# Patient Record
Sex: MALE | Race: WHITE | NOT HISPANIC OR LATINO | URBAN - METROPOLITAN AREA
[De-identification: names, ages, dates, MRNs, and addresses within clinical notes are randomized per-mention and may not be internally consistent; named-entity substitution may affect disease eponyms.]

---

## 2017-11-14 VITALS
HEIGHT: 68 IN | WEIGHT: 220.02 LBS | OXYGEN SATURATION: 96 % | HEART RATE: 80 BPM | DIASTOLIC BLOOD PRESSURE: 75 MMHG | RESPIRATION RATE: 16 BRPM | SYSTOLIC BLOOD PRESSURE: 130 MMHG | TEMPERATURE: 97 F

## 2017-11-14 RX ORDER — WARFARIN SODIUM 2.5 MG/1
0 TABLET ORAL
Qty: 0 | Refills: 0 | COMMUNITY

## 2017-11-14 NOTE — H&P ADULT - PMH
AAA (abdominal aortic aneurysm)    Atrial fibrillation    Coronary artery disease    Diabetes mellitus    Gout    Hyperlipidemia    Hypertension    Lung cancer AAA (abdominal aortic aneurysm)    Atrial fibrillation    Coronary artery disease    Diabetes mellitus    GIB (gastrointestinal bleeding)    Gout    Hyperlipidemia    Hypertension    Lung cancer

## 2017-11-14 NOTE — H&P ADULT - ASSESSMENT
84 y.o Male former heavy smoker with PMHx of HTN, Hyperlipidemia, NIDDM, Lung Cancer dx'd in 1970s s/p R partial lobectomy, AAA s/p EVAR in 04/2015, GIB in 2015 with unknown source s/p 4 units of PRBCs,  Afib on Coumadin (last dose on 11/10/17),  Known CAD with hx of 3VCABG @ [facility] on 05/26/2004 (LIMA-LAD, SVG-PDA, SVG-OM), who presents for recommended Cardiac Cath with possible intervention if clinically indicated secondary to worsening CCS Anginal Class 3 Equivalent Symptoms.        ASA: III and Mallampati: III  **OF NOTE: Pt with hx of Afib on Coumadin,  last dose taken on Friday 11/10/17.  INR of 1.2 today.  Pt also with hx of GIB ~ 2yrs ago workup revealed unknown source.  H/H remains stable today.  Pt was loaded with ASA 325mg PO X 1  and Plavix 600mg PO X 1 prior to procedure today.

## 2017-11-14 NOTE — H&P ADULT - HISTORY OF PRESENT ILLNESS
*** PT TO BRING IN ALL MEDS     84 y.o Male former heavy smoker with PMHx of HTN, Hyperlipidemia, NIDDM, Lung Cancer dx'd in 1970s s/p R partial lobectomy, AAA s/p EVAR in 04/2015, Afib (on Coumadin, last dose on 11/10/17),  Known CAD with hx of 3VCABG @ [facility] on 05/26/2004 (LIMA-LAD, SVG-PDA, SVG-OM), who presented to his cardiologist Dr. Harmon c/o progressively worsening FRANK with increasing fatigue upon minimal exertion over the past few months.  Pt states he can barely walk around his house and perform his ADLs before becoming easily winded. He denies any CP, recent PND/orthopnea, LE edema, palpitations, dizziness, syncope,  or decrease in exercise tolerance.    Echo done   revealed    In light of pt's risk factors, Known CAD, above CCS Anginal Class 3 Equivalent Symptoms with a preserved EF, pt is now referred to [facility] for recommended Cardiac Cath with possible intervention if clinically indicated to  r/o suspected progressive CAD. *** PT TO BRING IN ALL MEDS     84 y.o Male former heavy smoker with PMHx of HTN, Hyperlipidemia, NIDDM, Lung Cancer dx'd in 1970s s/p R partial lobectomy, AAA s/p EVAR in 04/2015, Afib (on Coumadin, last dose on 11/10/17),  Known CAD with hx of 3VCABG @ [facility] on 05/26/2004 (LIMA-LAD, SVG-PDA, SVG-OM), who presented to his cardiologist Dr. Harmon c/o progressively worsening FRANK with increasing fatigue upon minimal exertion over the past few months.  Pt states he can barely walk around his house and perform his ADLs before becoming easily winded often finding the need to sit down and  rest for awhile before completing a task. He denies any CP, recent PND/orthopnea, LE edema, palpitations, dizziness, syncope,  or decrease in exercise tolerance.  Echo done 05/15/17 revealed normal wall motion, mild concentric LVH, mildly LA and aortic root, LVEF of 55%.      In light of pt's risk factors, Known CAD, above CCS Anginal Class 3 Equivalent Symptoms with a preserved EF, pt is now referred to [facility] for recommended Cardiac Cath with possible intervention if clinically indicated to  r/o suspected progressive CAD. *** PT TO BRING IN ALL MEDS     84 y.o Male former heavy smoker with PMHx of HTN, Hyperlipidemia, NIDDM, Lung Cancer dx'd in 1970s s/p R partial lobectomy, AAA s/p EVAR in 04/2015, Afib on Coumadin (last dose on 11/10/17),  Known CAD with hx of 3VCABG @ [facility] on 05/26/2004 (LIMA-LAD, SVG-PDA, SVG-OM), who presented to his cardiologist Dr. Harmon c/o progressively worsening FRANK with increasing fatigue upon minimal exertion over the past few months.  Pt states he can barely walk around his house and perform his ADLs before becoming easily winded often finding the need to sit down and  rest for awhile before completing a task. He denies any CP, recent PND/orthopnea, LE edema, palpitations, dizziness, syncope,  or decrease in exercise tolerance.  Echo done 05/15/17 revealed normal wall motion, mild concentric LVH, mildly LA and aortic root, LVEF of 55%.      In light of pt's risk factors, Known CAD, above CCS Anginal Class 3 Equivalent Symptoms with a preserved EF, pt is now referred to [facility] for recommended Cardiac Cath with possible intervention if clinically indicated to  r/o suspected progressive CAD. 84 y.o Male former heavy smoker with PMHx of HTN, Hyperlipidemia, NIDDM, Lung Cancer dx'd in 1970s s/p R partial lobectomy, AAA s/p EVAR in 04/2015, GIB in 2015 with unknown source s/p 4 units of PRBCs,  Afib on Coumadin (last dose on 11/10/17),  Known CAD with hx of 3VCABG @ [facility] on 05/26/2004 (LIMA-LAD, SVG-PDA, SVG-OM), who presented to his cardiologist Dr. Harmon c/o progressively worsening FRANK with increasing fatigue upon minimal exertion over the past few months.  Pt states he can barely walk around his house and perform his ADLs before becoming easily winded often finding the need to sit down and  rest for awhile before completing a task. He denies any CP, recent PND/orthopnea, LE edema, palpitations, dizziness, syncope,  or decrease in exercise tolerance.  Echo done 05/15/17 revealed normal wall motion, mild concentric LVH, mildly LA and aortic root, LVEF of 55%.      In light of pt's risk factors, Known CAD, above CCS Anginal Class 3 Equivalent Symptoms with a preserved EF, pt is now referred to [facility] for recommended Cardiac Cath with possible intervention if clinically indicated to  r/o suspected progressive CAD.

## 2017-11-14 NOTE — H&P ADULT - PSH
Endoleak post (EVAR) endovascular aneurysm repair  In 2015  S/P CABG x 3  3VCABG @ [facility] on 05/26/2004 (LIMA-LAD, SVG-PDA, SVG-OM)  S/P partial lobectomy of lung

## 2017-11-15 ENCOUNTER — OUTPATIENT (OUTPATIENT)
Dept: OUTPATIENT SERVICES | Facility: HOSPITAL | Age: 82
LOS: 1 days | Discharge: NURSING, REHAB OR LONG-TERM CARE | End: 2017-11-15
Payer: MEDICARE

## 2017-11-15 DIAGNOSIS — Z95.1 PRESENCE OF AORTOCORONARY BYPASS GRAFT: Chronic | ICD-10-CM

## 2017-11-15 DIAGNOSIS — Z90.2 ACQUIRED ABSENCE OF LUNG [PART OF]: Chronic | ICD-10-CM

## 2017-11-15 DIAGNOSIS — T82.330A LEAKAGE OF AORTIC (BIFURCATION) GRAFT (REPLACEMENT), INITIAL ENCOUNTER: Chronic | ICD-10-CM

## 2017-11-15 LAB
ALBUMIN SERPL ELPH-MCNC: 3.9 G/DL — SIGNIFICANT CHANGE UP (ref 3.3–5)
ALP SERPL-CCNC: 71 U/L — SIGNIFICANT CHANGE UP (ref 40–120)
ALT FLD-CCNC: 8 U/L — LOW (ref 10–45)
ANION GAP SERPL CALC-SCNC: 14 MMOL/L — SIGNIFICANT CHANGE UP (ref 5–17)
APTT BLD: 28.8 SEC — SIGNIFICANT CHANGE UP (ref 27.5–37.4)
AST SERPL-CCNC: 11 U/L — SIGNIFICANT CHANGE UP (ref 10–40)
BASOPHILS NFR BLD AUTO: 0.1 % — SIGNIFICANT CHANGE UP (ref 0–2)
BILIRUB SERPL-MCNC: 0.4 MG/DL — SIGNIFICANT CHANGE UP (ref 0.2–1.2)
BUN SERPL-MCNC: 26 MG/DL — HIGH (ref 7–23)
CALCIUM SERPL-MCNC: 9.5 MG/DL — SIGNIFICANT CHANGE UP (ref 8.4–10.5)
CHLORIDE SERPL-SCNC: 94 MMOL/L — LOW (ref 96–108)
CHOLEST SERPL-MCNC: 162 MG/DL — SIGNIFICANT CHANGE UP (ref 10–199)
CK MB CFR SERPL CALC: 1.7 NG/ML — SIGNIFICANT CHANGE UP (ref 0–6.7)
CO2 SERPL-SCNC: 28 MMOL/L — SIGNIFICANT CHANGE UP (ref 22–31)
CREAT SERPL-MCNC: 1.13 MG/DL — SIGNIFICANT CHANGE UP (ref 0.5–1.3)
CRP SERPL-MCNC: 0.8 MG/DL — HIGH (ref 0–0.4)
EOSINOPHIL NFR BLD AUTO: 1.1 % — SIGNIFICANT CHANGE UP (ref 0–6)
GLUCOSE SERPL-MCNC: 127 MG/DL — HIGH (ref 70–99)
HBA1C BLD-MCNC: 6.6 % — HIGH (ref 4–5.6)
HCT VFR BLD CALC: 42.3 % — SIGNIFICANT CHANGE UP (ref 39–50)
HDLC SERPL-MCNC: 47 MG/DL — SIGNIFICANT CHANGE UP (ref 40–125)
HGB BLD-MCNC: 13.5 G/DL — SIGNIFICANT CHANGE UP (ref 13–17)
INR BLD: 1.28 — HIGH (ref 0.88–1.16)
LIPID PNL WITH DIRECT LDL SERPL: 93 MG/DL — SIGNIFICANT CHANGE UP
LYMPHOCYTES # BLD AUTO: 22.4 % — SIGNIFICANT CHANGE UP (ref 13–44)
MCHC RBC-ENTMCNC: 29.9 PG — SIGNIFICANT CHANGE UP (ref 27–34)
MCHC RBC-ENTMCNC: 31.9 G/DL — LOW (ref 32–36)
MCV RBC AUTO: 93.8 FL — SIGNIFICANT CHANGE UP (ref 80–100)
MONOCYTES NFR BLD AUTO: 8.6 % — SIGNIFICANT CHANGE UP (ref 2–14)
NEUTROPHILS NFR BLD AUTO: 67.8 % — SIGNIFICANT CHANGE UP (ref 43–77)
PLATELET # BLD AUTO: 189 K/UL — SIGNIFICANT CHANGE UP (ref 150–400)
POTASSIUM SERPL-MCNC: 3.8 MMOL/L — SIGNIFICANT CHANGE UP (ref 3.5–5.3)
POTASSIUM SERPL-SCNC: 3.8 MMOL/L — SIGNIFICANT CHANGE UP (ref 3.5–5.3)
PROT SERPL-MCNC: 8.1 G/DL — SIGNIFICANT CHANGE UP (ref 6–8.3)
PROTHROM AB SERPL-ACNC: 14.3 SEC — HIGH (ref 9.8–12.7)
RBC # BLD: 4.51 M/UL — SIGNIFICANT CHANGE UP (ref 4.2–5.8)
RBC # FLD: 14.5 % — SIGNIFICANT CHANGE UP (ref 10.3–16.9)
SODIUM SERPL-SCNC: 136 MMOL/L — SIGNIFICANT CHANGE UP (ref 135–145)
TOTAL CHOLESTEROL/HDL RATIO MEASUREMENT: 3.4 RATIO — SIGNIFICANT CHANGE UP (ref 3.4–9.6)
TRIGL SERPL-MCNC: 112 MG/DL — SIGNIFICANT CHANGE UP (ref 10–149)
WBC # BLD: 10.1 K/UL — SIGNIFICANT CHANGE UP (ref 3.8–10.5)
WBC # FLD AUTO: 10.1 K/UL — SIGNIFICANT CHANGE UP (ref 3.8–10.5)

## 2017-11-15 PROCEDURE — 85610 PROTHROMBIN TIME: CPT

## 2017-11-15 PROCEDURE — 36415 COLL VENOUS BLD VENIPUNCTURE: CPT

## 2017-11-15 PROCEDURE — 93459 L HRT ART/GRFT ANGIO: CPT | Mod: 26

## 2017-11-15 PROCEDURE — 82550 ASSAY OF CK (CPK): CPT

## 2017-11-15 PROCEDURE — C1887: CPT

## 2017-11-15 PROCEDURE — 93459 L HRT ART/GRFT ANGIO: CPT

## 2017-11-15 PROCEDURE — 80061 LIPID PANEL: CPT

## 2017-11-15 PROCEDURE — C1769: CPT

## 2017-11-15 PROCEDURE — 82553 CREATINE MB FRACTION: CPT

## 2017-11-15 PROCEDURE — 85025 COMPLETE CBC W/AUTO DIFF WBC: CPT

## 2017-11-15 PROCEDURE — 93567 NJX CAR CTH SPRVLV AORTGRPHY: CPT

## 2017-11-15 PROCEDURE — C1760: CPT

## 2017-11-15 PROCEDURE — 83036 HEMOGLOBIN GLYCOSYLATED A1C: CPT

## 2017-11-15 PROCEDURE — C1889: CPT

## 2017-11-15 PROCEDURE — 80053 COMPREHEN METABOLIC PANEL: CPT

## 2017-11-15 PROCEDURE — 86140 C-REACTIVE PROTEIN: CPT

## 2017-11-15 PROCEDURE — C1894: CPT

## 2017-11-15 PROCEDURE — 85730 THROMBOPLASTIN TIME PARTIAL: CPT

## 2017-11-15 RX ORDER — ALLOPURINOL 300 MG
2 TABLET ORAL
Qty: 0 | Refills: 0 | COMMUNITY

## 2017-11-15 RX ORDER — WARFARIN SODIUM 2.5 MG/1
0 TABLET ORAL
Qty: 0 | Refills: 0 | COMMUNITY

## 2017-11-15 RX ORDER — METFORMIN HYDROCHLORIDE 850 MG/1
1 TABLET ORAL
Qty: 0 | Refills: 0 | COMMUNITY

## 2017-11-15 RX ORDER — WARFARIN SODIUM 2.5 MG/1
1 TABLET ORAL
Qty: 0 | Refills: 0 | COMMUNITY

## 2017-11-15 RX ORDER — ALLOPURINOL 300 MG
1 TABLET ORAL
Qty: 0 | Refills: 0 | COMMUNITY

## 2017-11-15 RX ORDER — CHLORHEXIDINE GLUCONATE 213 G/1000ML
1 SOLUTION TOPICAL ONCE
Qty: 0 | Refills: 0 | Status: DISCONTINUED | OUTPATIENT
Start: 2017-11-15 | End: 2017-11-15

## 2017-11-15 RX ORDER — METOPROLOL TARTRATE 50 MG
25 TABLET ORAL
Qty: 0 | Refills: 0 | COMMUNITY

## 2017-11-15 RX ORDER — ATORVASTATIN CALCIUM 80 MG/1
1 TABLET, FILM COATED ORAL
Qty: 0 | Refills: 0 | COMMUNITY

## 2017-11-15 RX ORDER — ASPIRIN/CALCIUM CARB/MAGNESIUM 324 MG
1 TABLET ORAL
Qty: 0 | Refills: 0 | COMMUNITY

## 2017-11-15 RX ORDER — CLOPIDOGREL BISULFATE 75 MG/1
600 TABLET, FILM COATED ORAL ONCE
Qty: 0 | Refills: 0 | Status: DISCONTINUED | OUTPATIENT
Start: 2017-11-15 | End: 2017-11-15

## 2017-11-15 RX ORDER — ASPIRIN/CALCIUM CARB/MAGNESIUM 324 MG
325 TABLET ORAL ONCE
Qty: 0 | Refills: 0 | Status: DISCONTINUED | OUTPATIENT
Start: 2017-11-15 | End: 2017-11-15

## 2017-11-15 RX ORDER — BENAZEPRIL HYDROCHLORIDE 40 MG/1
1 TABLET ORAL
Qty: 0 | Refills: 0 | COMMUNITY

## 2017-11-15 RX ORDER — SODIUM CHLORIDE 9 MG/ML
500 INJECTION INTRAMUSCULAR; INTRAVENOUS; SUBCUTANEOUS
Qty: 0 | Refills: 0 | Status: DISCONTINUED | OUTPATIENT
Start: 2017-11-15 | End: 2017-11-15

## 2017-11-15 RX ORDER — INSULIN LISPRO 100/ML
VIAL (ML) SUBCUTANEOUS ONCE
Qty: 0 | Refills: 0 | Status: DISCONTINUED | OUTPATIENT
Start: 2017-11-15 | End: 2017-11-15

## 2017-11-15 NOTE — PROGRESS NOTE ADULT - SUBJECTIVE AND OBJECTIVE BOX
Procedure: LHC, [place]  Indication: UA, CAD  Complication: none    Result:  1) Three vessel CAD ( 80% LM, 100% pLAD, 80% OM1, 70% pRCA)  2) Normal LV function, EF65%, LVEDP 9  3) Patent LIMA to LAD  4) Patent SVG to RCA  5) Occluded SVG to OM1  6) Mild AI, no AS    Plan: Medical management. D/C home. To f/u with Dr. Harmon.

## 2017-11-15 NOTE — PROGRESS NOTE ADULT - SUBJECTIVE AND OBJECTIVE BOX
Interventional Cardiology PA SDA Discharge Note    Patient seen and examined comfortably, denies current complaints    Afebrile, VSS    Ext:    		Right Groin:       hematoma,     bruit, dressing; C/D/I        Pulses:    intact right DP/PT at baseline     A/P:  84 y.o Male former heavy smoker with PMHx of HTN, Hyperlipidemia, NIDDM, Lung Cancer dx'd in 1970s s/p R partial lobectomy, AAA s/p EVAR in 04/2015, CAD s/p prior CABG with CCS Angina Class III equivalent symptoms who presented to [facility] for evaluation. Patient s/p diagnostic cath revealing 3VD:  80% LM, 100% pLAD, 80% OM1, 70% pRCA, EF65%, LVEDP 9mmHg.  Patent LIMA to LAD, patent SVG to RCA,  and occluded SVG to OM1. Patient recommended to continue current medical therapy and follow-up with Dr. Harmon in 1-2 weeks.                  1.	Stable for discharge as per attending Dr. Porter after bed rest, pt voids, groin  stable and 30 minutes of ambulation.  2.	Follow-up with PMD/Cardiologist Dr. Harmon in 1-2 weeks  3.	Discharged forms signed and copies in chart

## 2018-06-11 PROBLEM — E78.5 HYPERLIPIDEMIA, UNSPECIFIED: Chronic | Status: ACTIVE | Noted: 2017-11-14

## 2018-06-11 PROBLEM — E11.9 TYPE 2 DIABETES MELLITUS WITHOUT COMPLICATIONS: Chronic | Status: ACTIVE | Noted: 2017-11-14

## 2018-06-11 PROBLEM — I48.91 UNSPECIFIED ATRIAL FIBRILLATION: Chronic | Status: ACTIVE | Noted: 2017-11-14

## 2018-06-11 PROBLEM — M10.9 GOUT, UNSPECIFIED: Chronic | Status: ACTIVE | Noted: 2017-11-14

## 2018-06-11 PROBLEM — I10 ESSENTIAL (PRIMARY) HYPERTENSION: Chronic | Status: ACTIVE | Noted: 2017-11-14

## 2018-06-11 PROBLEM — I25.10 ATHEROSCLEROTIC HEART DISEASE OF NATIVE CORONARY ARTERY WITHOUT ANGINA PECTORIS: Chronic | Status: ACTIVE | Noted: 2017-11-14

## 2018-06-11 PROBLEM — C34.90 MALIGNANT NEOPLASM OF UNSPECIFIED PART OF UNSPECIFIED BRONCHUS OR LUNG: Chronic | Status: ACTIVE | Noted: 2017-11-14

## 2018-06-11 PROBLEM — I71.4 ABDOMINAL AORTIC ANEURYSM, WITHOUT RUPTURE: Chronic | Status: ACTIVE | Noted: 2017-11-14

## 2018-06-11 PROBLEM — K92.2 GASTROINTESTINAL HEMORRHAGE, UNSPECIFIED: Chronic | Status: ACTIVE | Noted: 2017-11-15

## 2018-07-23 PROBLEM — Z00.00 ENCOUNTER FOR PREVENTIVE HEALTH EXAMINATION: Status: ACTIVE | Noted: 2018-07-23

## 2018-08-22 ENCOUNTER — APPOINTMENT (OUTPATIENT)
Dept: HEART AND VASCULAR | Facility: CLINIC | Age: 83
End: 2018-08-22
Payer: MEDICARE

## 2018-08-22 VITALS — SYSTOLIC BLOOD PRESSURE: 125 MMHG | DIASTOLIC BLOOD PRESSURE: 70 MMHG

## 2018-08-22 VITALS — BODY MASS INDEX: 31.1 KG/M2 | HEIGHT: 69 IN | WEIGHT: 210 LBS

## 2018-08-22 DIAGNOSIS — I70.8 ATHEROSCLEROSIS OF OTHER ARTERIES: ICD-10-CM

## 2018-08-22 DIAGNOSIS — Z87.891 PERSONAL HISTORY OF NICOTINE DEPENDENCE: ICD-10-CM

## 2018-08-22 DIAGNOSIS — M10.9 GOUT, UNSPECIFIED: ICD-10-CM

## 2018-08-22 DIAGNOSIS — Z85.118 PERSONAL HISTORY OF OTHER MALIGNANT NEOPLASM OF BRONCHUS AND LUNG: ICD-10-CM

## 2018-08-22 LAB — INR PPP: 1.6 RATIO

## 2018-08-22 PROCEDURE — 93306 TTE W/DOPPLER COMPLETE: CPT

## 2018-08-22 PROCEDURE — 93000 ELECTROCARDIOGRAM COMPLETE: CPT

## 2018-08-22 PROCEDURE — 99214 OFFICE O/P EST MOD 30 MIN: CPT | Mod: 25

## 2018-08-22 RX ORDER — TORSEMIDE 10 MG/1
10 TABLET ORAL
Qty: 30 | Refills: 0 | Status: DISCONTINUED | COMMUNITY
Start: 2018-06-12 | End: 2018-08-22

## 2018-08-22 RX ORDER — OXYCODONE AND ACETAMINOPHEN 5; 325 MG/1; MG/1
5-325 TABLET ORAL
Qty: 14 | Refills: 0 | Status: ACTIVE | COMMUNITY
Start: 2018-07-18

## 2018-08-22 RX ORDER — ALLOPURINOL 100 MG/1
100 TABLET ORAL
Refills: 0 | Status: ACTIVE | COMMUNITY
Start: 2017-10-17

## 2018-08-22 RX ORDER — MAGNESIUM HYDROXIDE 1200 MG/15ML
0.9 LIQUID ORAL
Qty: 1000 | Refills: 0 | Status: ACTIVE | COMMUNITY
Start: 2018-06-13

## 2018-08-22 RX ORDER — WARFARIN 4 MG/1
4 TABLET ORAL
Qty: 30 | Refills: 0 | Status: DISCONTINUED | COMMUNITY
Start: 2018-06-11 | End: 2018-08-22

## 2018-11-14 ENCOUNTER — APPOINTMENT (OUTPATIENT)
Dept: HEART AND VASCULAR | Facility: CLINIC | Age: 83
End: 2018-11-14
Payer: MEDICARE

## 2018-11-14 VITALS — SYSTOLIC BLOOD PRESSURE: 140 MMHG | DIASTOLIC BLOOD PRESSURE: 70 MMHG

## 2018-11-14 VITALS — BODY MASS INDEX: 31.1 KG/M2 | HEIGHT: 69 IN | WEIGHT: 210 LBS

## 2018-11-14 PROCEDURE — 99214 OFFICE O/P EST MOD 30 MIN: CPT

## 2018-11-14 PROCEDURE — 93000 ELECTROCARDIOGRAM COMPLETE: CPT

## 2018-11-14 RX ORDER — BENAZEPRIL HYDROCHLORIDE 40 MG/1
40 TABLET, FILM COATED ORAL
Refills: 0 | Status: DISCONTINUED | COMMUNITY
Start: 2017-10-17 | End: 2018-11-14

## 2018-11-14 NOTE — REVIEW OF SYSTEMS
[Feeling Fatigued] : feeling fatigued [Shortness Of Breath] : shortness of breath [Palpitations] : palpitations [Abdominal Pain] : no abdominal pain

## 2018-11-14 NOTE — ASSESSMENT
[FreeTextEntry1] : Patient has a persistent symptoms of dyspnea despite pretty well preserved LV function and minimal amount of ischemic disease. In light of the fact that he had a prior lung resection and has audible wheezing on exam it would be reasonable for him to get a pulmonary evaluation for her PFTs and/or CT scan and determine whether or not he would benefit from bronchodilator therapy\par Target LDL<70 HDL >45 \par Target INR 2-3 for embolic prophylaxis

## 2018-11-14 NOTE — PHYSICAL EXAM
[General Appearance - Well Developed] : well developed [Normal Appearance] : normal appearance [Well Groomed] : well groomed [General Appearance - Well Nourished] : well nourished [No Deformities] : no deformities [General Appearance - In No Acute Distress] : no acute distress [Normal Conjunctiva] : the conjunctiva exhibited no abnormalities [Eyelids - No Xanthelasma] : the eyelids demonstrated no xanthelasmas [Normal Oral Mucosa] : normal oral mucosa [No Oral Pallor] : no oral pallor [No Oral Cyanosis] : no oral cyanosis [Normal Jugular Venous A Waves Present] : normal jugular venous A waves present [Normal Jugular Venous V Waves Present] : normal jugular venous V waves present [No Jugular Venous Sandoval A Waves] : no jugular venous sandoval A waves [Abdomen Soft] : soft [Abdomen Tenderness] : non-tender [Abdomen Mass (___ Cm)] : no abdominal mass palpated [Abnormal Walk] : normal gait [Gait - Sufficient For Exercise Testing] : the gait was sufficient for exercise testing [Nail Clubbing] : no clubbing of the fingernails [Cyanosis, Localized] : no localized cyanosis [Petechial Hemorrhages (___cm)] : no petechial hemorrhages [Skin Color & Pigmentation] : normal skin color and pigmentation [] : no rash [No Venous Stasis] : no venous stasis [Skin Lesions] : no skin lesions [No Skin Ulcers] : no skin ulcer [No Xanthoma] : no  xanthoma was observed [Scattered Wheezes] : scattered wheezing was heard [Normal] : normal [Irregularly Irregular] : irregularly irregular [Normal S1] : normal S1 [Normal S2] : normal S2 [II] : a grade 2 [2+] : left 2+ [1+] : left 1+ [0] : left 0 [No Pitting Edema] : no pitting edema present [Rt] : varicose veins of the right leg noted [Lt] : varicose veins of the left leg noted [Wheezing Unilaterally On The Right At The Midlung Field] : wheezing was heard over the right midlung field [Wheezing Unilaterally On The Right At The Base] : wheezing was heard over the right base

## 2018-11-14 NOTE — HISTORY OF PRESENT ILLNESS
[FreeTextEntry1] : The patient is an 85-year-old white male with a prior history of coronary bypass surgery. He underwent evaluation last year during which a catheterization revealed a patent LIMA vessel and patent vein grafts to the distal RCA with an isolated vein graft occlusion to the OM vessel EF at that time was 55%. He is also maintained on meds for chronic atrial fibrillation. He still has moderate degree of dyspnea typically with slightly greater than normal levels of activity including carrying packages and working up her pills. He has no signs of edema orthopnea PND. His sleep pattern is by his description largely due to the severe insomnia.

## 2019-03-28 ENCOUNTER — APPOINTMENT (OUTPATIENT)
Dept: HEART AND VASCULAR | Facility: CLINIC | Age: 84
End: 2019-03-28
Payer: MEDICARE

## 2019-03-28 VITALS — HEART RATE: 76 BPM | OXYGEN SATURATION: 97 % | SYSTOLIC BLOOD PRESSURE: 140 MMHG | DIASTOLIC BLOOD PRESSURE: 70 MMHG

## 2019-03-28 VITALS — WEIGHT: 220 LBS | HEIGHT: 69 IN | BODY MASS INDEX: 32.58 KG/M2

## 2019-03-28 LAB — INR PPP: 2.4

## 2019-03-28 PROCEDURE — 99214 OFFICE O/P EST MOD 30 MIN: CPT

## 2019-03-28 PROCEDURE — 93000 ELECTROCARDIOGRAM COMPLETE: CPT

## 2019-03-28 NOTE — HISTORY OF PRESENT ILLNESS
[FreeTextEntry1] : The patient is an 85-year-old white male with a prior history of coronary bypass surgery. He underwent evaluation last year during which a catheterization revealed a patent LIMA vessel and patent vein grafts to the distal RCA with an isolated vein graft occlusion to the OM vessel EF at that time was 55%. He is also maintained on meds for chronic atrial fibrillation. He still has moderate degree of dyspnea typically with slightly greater than normal levels of activity including carrying packages and working up her pills. He has no signs of edema orthopnea PND. His sleep pattern is by his description largely due to the severe insomnia.\par seen by PULM for dyspnea has scar tissue on CT scan \par awaiting PFTs \par no wheeze no edema or orthopnea

## 2019-03-28 NOTE — ASSESSMENT
[FreeTextEntry1] : Patient has a persistent symptoms of dyspnea despite pretty well preserved LV function and minimal amount of ischemic disease. In light of the fact that he had a prior lung resection and has audible wheezing on exam it would be reasonable for him to get a pulmonary evaluation for her PFTs and/or CT scan and determine whether or not he would benefit from bronchodilator therapy\par Target LDL<70 HDL >45 \par Target INR 2-3 for embolic prophylaxis \par INR today 2.4  on coumadin 5mg x2 2.5 x 5days \par slow afib on ekg with periodic dizziness \par will lower metoprolol to 25 qd \par f/u 2 mos ? holter need to assess SSS

## 2019-03-28 NOTE — PHYSICAL EXAM
[General Appearance - Well Developed] : well developed [Normal Appearance] : normal appearance [Well Groomed] : well groomed [General Appearance - Well Nourished] : well nourished [No Deformities] : no deformities [General Appearance - In No Acute Distress] : no acute distress [Normal Conjunctiva] : the conjunctiva exhibited no abnormalities [Eyelids - No Xanthelasma] : the eyelids demonstrated no xanthelasmas [Normal Oral Mucosa] : normal oral mucosa [No Oral Pallor] : no oral pallor [No Oral Cyanosis] : no oral cyanosis [Normal Jugular Venous A Waves Present] : normal jugular venous A waves present [Normal Jugular Venous V Waves Present] : normal jugular venous V waves present [No Jugular Venous Sandoval A Waves] : no jugular venous sandoval A waves [Abdomen Soft] : soft [Abdomen Tenderness] : non-tender [Abdomen Mass (___ Cm)] : no abdominal mass palpated [Abnormal Walk] : normal gait [Gait - Sufficient For Exercise Testing] : the gait was sufficient for exercise testing [Nail Clubbing] : no clubbing of the fingernails [Cyanosis, Localized] : no localized cyanosis [Petechial Hemorrhages (___cm)] : no petechial hemorrhages [Skin Color & Pigmentation] : normal skin color and pigmentation [] : no rash [No Venous Stasis] : no venous stasis [Skin Lesions] : no skin lesions [No Skin Ulcers] : no skin ulcer [No Xanthoma] : no  xanthoma was observed [Scattered Wheezes] : scattered wheezing was heard [Wheezing Unilaterally On The Right At The Midlung Field] : wheezing was heard over the right midlung field [Wheezing Unilaterally On The Right At The Base] : wheezing was heard over the right base [Normal] : normal [Irregularly Irregular] : irregularly irregular [Normal S1] : normal S1 [Normal S2] : normal S2 [II] : a grade 2 [2+] : left 2+ [1+] : left 1+ [0] : left 0 [No Pitting Edema] : no pitting edema present [Rt] : varicose veins of the right leg noted [Lt] : varicose veins of the left leg noted

## 2019-06-27 ENCOUNTER — APPOINTMENT (OUTPATIENT)
Dept: HEART AND VASCULAR | Facility: CLINIC | Age: 84
End: 2019-06-27
Payer: MEDICARE

## 2019-06-27 VITALS — HEIGHT: 69 IN | BODY MASS INDEX: 33.33 KG/M2 | WEIGHT: 225 LBS

## 2019-06-27 LAB — INR PPP: 1.8 RATIO

## 2019-06-27 PROCEDURE — 93000 ELECTROCARDIOGRAM COMPLETE: CPT

## 2019-06-27 PROCEDURE — 99214 OFFICE O/P EST MOD 30 MIN: CPT

## 2019-06-27 NOTE — ASSESSMENT
[FreeTextEntry1] : dyspnea more related to COPD and pulm fibrosis \par no dizziness or syncopy on lower dose of beta blcoker\par INR 1.8 will leave on coumadin 5mg x 2days and 2.5 mg x5 days

## 2019-06-27 NOTE — PHYSICAL EXAM
[General Appearance - Well Developed] : well developed [Normal Appearance] : normal appearance [Well Groomed] : well groomed [General Appearance - Well Nourished] : well nourished [No Deformities] : no deformities [General Appearance - In No Acute Distress] : no acute distress [Normal Conjunctiva] : the conjunctiva exhibited no abnormalities [Eyelids - No Xanthelasma] : the eyelids demonstrated no xanthelasmas [Normal Oral Mucosa] : normal oral mucosa [No Oral Pallor] : no oral pallor [No Oral Cyanosis] : no oral cyanosis [Normal Jugular Venous A Waves Present] : normal jugular venous A waves present [Normal Jugular Venous V Waves Present] : normal jugular venous V waves present [No Jugular Venous Sandoval A Waves] : no jugular venous sandoval A waves [Abdomen Soft] : soft [Abdomen Tenderness] : non-tender [Abnormal Walk] : normal gait [Gait - Sufficient For Exercise Testing] : the gait was sufficient for exercise testing [Abdomen Mass (___ Cm)] : no abdominal mass palpated [Cyanosis, Localized] : no localized cyanosis [Nail Clubbing] : no clubbing of the fingernails [Petechial Hemorrhages (___cm)] : no petechial hemorrhages [Skin Color & Pigmentation] : normal skin color and pigmentation [] : no rash [No Venous Stasis] : no venous stasis [No Skin Ulcers] : no skin ulcer [Skin Lesions] : no skin lesions [No Xanthoma] : no  xanthoma was observed [Scattered Wheezes] : scattered wheezing was heard [Wheezing Unilaterally On The Right At The Midlung Field] : wheezing was heard over the right midlung field [Wheezing Unilaterally On The Right At The Base] : wheezing was heard over the right base [Normal] : normal [Normal S1] : normal S1 [Irregularly Irregular] : irregularly irregular [Normal S2] : normal S2 [II] : a grade 2 [2+] : left 2+ [1+] : left 1+ [No Pitting Edema] : no pitting edema present [0] : left 0 [Rt] : varicose veins of the right leg noted [Lt] : varicose veins of the left leg noted

## 2019-06-27 NOTE — HISTORY OF PRESENT ILLNESS
[FreeTextEntry1] : The patient is an 85-year-old white male with a prior history of coronary bypass surgery. He underwent evaluation last year during which a catheterization revealed a patent LIMA vessel and patent vein grafts to the distal RCA with an isolated vein graft occlusion to the OM vessel EF at that time was 55%. He is also maintained on meds for chronic atrial fibrillation. He still has moderate degree of dyspnea typically with slightly greater than normal levels of activity including carrying packages and working up her pills. He has no signs of edema orthopnea PND. His sleep pattern is by his description largely due to the severe insomnia.\par seen by PULM for dyspnea has scar tissue on CT scan \par PFT c/w with reduced vent function\par no benefit w inhalers \par no wheeze no edema or orthopnea

## 2019-10-03 ENCOUNTER — APPOINTMENT (OUTPATIENT)
Dept: HEART AND VASCULAR | Facility: CLINIC | Age: 84
End: 2019-10-03
Payer: MEDICARE

## 2019-10-03 ENCOUNTER — NON-APPOINTMENT (OUTPATIENT)
Age: 84
End: 2019-10-03

## 2019-10-03 VITALS
WEIGHT: 219 LBS | DIASTOLIC BLOOD PRESSURE: 70 MMHG | SYSTOLIC BLOOD PRESSURE: 125 MMHG | BODY MASS INDEX: 32.44 KG/M2 | HEIGHT: 69 IN

## 2019-10-03 VITALS — OXYGEN SATURATION: 99 %

## 2019-10-03 DIAGNOSIS — R27.0 ATAXIA, UNSPECIFIED: ICD-10-CM

## 2019-10-03 LAB — INR PPP: 2 RATIO

## 2019-10-03 PROCEDURE — 93000 ELECTROCARDIOGRAM COMPLETE: CPT

## 2019-10-03 PROCEDURE — 93880 EXTRACRANIAL BILAT STUDY: CPT

## 2019-10-03 PROCEDURE — 93306 TTE W/DOPPLER COMPLETE: CPT

## 2019-10-03 PROCEDURE — 99214 OFFICE O/P EST MOD 30 MIN: CPT

## 2019-10-03 NOTE — HISTORY OF PRESENT ILLNESS
[FreeTextEntry1] : The patient is an 85-year-old white male with a prior history of coronary bypass surgery. He underwent evaluation last year during which a catheterization revealed a patent LIMA vessel and patent vein grafts to the distal RCA with an isolated vein graft occlusion to the OM vessel EF at that time was 55%. He is also maintained on meds for chronic atrial fibrillation. He still has moderate degree of dyspnea typically with slightly greater than normal levels of activity including carrying packages and working up her pills. He has no signs of edema orthopnea PND. His sleep pattern is by his description largely due to the severe insomnia.\par seen by PULM for dyspnea has scar tissue on CT scan \par PFT c/w with reduced vent function\par no benefit w inhalers \par \par Worseing gait with imbalance and pain in legs \par no wheeze no edema or orthopnea \par tolerates 1/2 block dyspnea \par pulm htn on prior echo

## 2019-10-03 NOTE — ASSESSMENT
[FreeTextEntry1] : Imprerssion\par Afib controlled on meds INR today 2.0 will continue \par 5 days 2.5 mg and 2 days 5mg\par meds renewed\par No progression of carotid disease \par walking limited due to lung fibrosis O2 sat 99 at rest

## 2019-10-03 NOTE — PHYSICAL EXAM
[General Appearance - Well Developed] : well developed [Normal Appearance] : normal appearance [Well Groomed] : well groomed [General Appearance - Well Nourished] : well nourished [No Deformities] : no deformities [General Appearance - In No Acute Distress] : no acute distress [Normal Conjunctiva] : the conjunctiva exhibited no abnormalities [Eyelids - No Xanthelasma] : the eyelids demonstrated no xanthelasmas [Normal Oral Mucosa] : normal oral mucosa [No Oral Pallor] : no oral pallor [No Oral Cyanosis] : no oral cyanosis [Normal Jugular Venous A Waves Present] : normal jugular venous A waves present [Normal Jugular Venous V Waves Present] : normal jugular venous V waves present [No Jugular Venous Sandoval A Waves] : no jugular venous sandoval A waves [Abdomen Soft] : soft [Abdomen Tenderness] : non-tender [Abdomen Mass (___ Cm)] : no abdominal mass palpated [Abnormal Walk] : normal gait [Gait - Sufficient For Exercise Testing] : the gait was sufficient for exercise testing [Nail Clubbing] : no clubbing of the fingernails [Cyanosis, Localized] : no localized cyanosis [Petechial Hemorrhages (___cm)] : no petechial hemorrhages [Skin Color & Pigmentation] : normal skin color and pigmentation [] : no rash [No Venous Stasis] : no venous stasis [Skin Lesions] : no skin lesions [No Skin Ulcers] : no skin ulcer [No Xanthoma] : no  xanthoma was observed [Scattered Wheezes] : scattered wheezing was heard [Wheezing Unilaterally On The Right At The Midlung Field] : wheezing was heard over the right midlung field [Wheezing Unilaterally On The Right At The Base] : wheezing was heard over the right base [Normal] : normal [Irregularly Irregular] : irregularly irregular [Normal S1] : normal S1 [Normal S2] : normal S2 [II] : a grade 2 [2+] : left 2+ [1+] : left 1+ [0] : left 0 [No Pitting Edema] : no pitting edema present [Rt] : varicose veins of the right leg noted [Lt] : varicose veins of the left leg noted [FreeTextEntry1] : scattered wheeze

## 2019-10-03 NOTE — REASON FOR VISIT
[FreeTextEntry1] : AFIB \par CAD\par PVD\par progressive dyspnea with exertion\par GAIT ABNL \par pulmonary fibrosis

## 2020-01-02 ENCOUNTER — RX RENEWAL (OUTPATIENT)
Age: 85
End: 2020-01-02

## 2020-02-06 ENCOUNTER — NON-APPOINTMENT (OUTPATIENT)
Age: 85
End: 2020-02-06

## 2020-02-06 ENCOUNTER — APPOINTMENT (OUTPATIENT)
Dept: HEART AND VASCULAR | Facility: CLINIC | Age: 85
End: 2020-02-06
Payer: MEDICARE

## 2020-02-06 VITALS
SYSTOLIC BLOOD PRESSURE: 160 MMHG | DIASTOLIC BLOOD PRESSURE: 80 MMHG | HEART RATE: 64 BPM | BODY MASS INDEX: 33.47 KG/M2 | WEIGHT: 226 LBS | OXYGEN SATURATION: 96 % | HEIGHT: 69 IN

## 2020-02-06 LAB — INR PPP: 1.9 RATIO

## 2020-02-06 PROCEDURE — 99215 OFFICE O/P EST HI 40 MIN: CPT

## 2020-02-06 PROCEDURE — 36415 COLL VENOUS BLD VENIPUNCTURE: CPT

## 2020-02-06 PROCEDURE — 93000 ELECTROCARDIOGRAM COMPLETE: CPT

## 2020-02-06 PROCEDURE — 85610 PROTHROMBIN TIME: CPT | Mod: QW

## 2020-02-06 RX ORDER — TORSEMIDE 20 MG/1
20 TABLET ORAL
Qty: 90 | Refills: 3 | Status: DISCONTINUED | COMMUNITY
Start: 2018-02-26 | End: 2020-02-06

## 2020-02-06 NOTE — ASSESSMENT
[FreeTextEntry1] : Imprerssion\par Patient is a bit more dyspnea with lower levels of activity there is no overt signs of fluid overload on exam he has chronic crackles on his lung exam with decreased breath sounds at the right base chest x-ray was ordered his blood pressure Benazapril increased from 20 mg per day to 20 mg twice a day torsemide will increase twice a day for 3 days to augment some degree of diuresis.t \par Inr 1.9 coumadin dose reviewe d

## 2020-02-06 NOTE — REVIEW OF SYSTEMS
[Shortness Of Breath] : shortness of breath [Feeling Fatigued] : feeling fatigued [Palpitations] : palpitations [Abdominal Pain] : no abdominal pain

## 2020-02-06 NOTE — PHYSICAL EXAM
[General Appearance - Well Developed] : well developed [Normal Appearance] : normal appearance [General Appearance - Well Nourished] : well nourished [Well Groomed] : well groomed [No Deformities] : no deformities [General Appearance - In No Acute Distress] : no acute distress [Normal Conjunctiva] : the conjunctiva exhibited no abnormalities [Eyelids - No Xanthelasma] : the eyelids demonstrated no xanthelasmas [Normal Oral Mucosa] : normal oral mucosa [No Oral Pallor] : no oral pallor [No Oral Cyanosis] : no oral cyanosis [Normal Jugular Venous A Waves Present] : normal jugular venous A waves present [Normal Jugular Venous V Waves Present] : normal jugular venous V waves present [No Jugular Venous Sandoval A Waves] : no jugular venous sandoval A waves [Abdomen Tenderness] : non-tender [Abdomen Soft] : soft [Abdomen Mass (___ Cm)] : no abdominal mass palpated [Gait - Sufficient For Exercise Testing] : the gait was sufficient for exercise testing [Abnormal Walk] : normal gait [Nail Clubbing] : no clubbing of the fingernails [Cyanosis, Localized] : no localized cyanosis [Petechial Hemorrhages (___cm)] : no petechial hemorrhages [Skin Color & Pigmentation] : normal skin color and pigmentation [] : no rash [No Venous Stasis] : no venous stasis [Skin Lesions] : no skin lesions [No Xanthoma] : no  xanthoma was observed [No Skin Ulcers] : no skin ulcer [Wheezing Unilaterally On The Right At The Midlung Field] : wheezing was heard over the right midlung field [Scattered Wheezes] : scattered wheezing was heard [Wheezing Unilaterally On The Right At The Base] : wheezing was heard over the right base [Normal] : normal [Irregularly Irregular] : irregularly irregular [Normal S2] : normal S2 [Normal S1] : normal S1 [II] : a grade 2 [2+] : left 2+ [1+] : left 1+ [No Pitting Edema] : no pitting edema present [0] : right 0 [Lt] : varicose veins of the left leg noted [Rt] : varicose veins of the right leg noted [FreeTextEntry1] : scattered wheeze

## 2020-02-06 NOTE — HISTORY OF PRESENT ILLNESS
[FreeTextEntry1] : The patient is an 85-year-old white male with a prior history of coronary bypass surgery. He underwent evaluation last year during which a catheterization revealed a patent LIMA vessel and patent vein grafts to the distal RCA with an isolated vein graft occlusion to the OM vessel EF at that time was 55%. He is also maintained on meds for chronic atrial fibrillation. He still has moderate degree of dyspnea typically with slightly greater than normal levels of activity including carrying packages and working up her pills. He has no signs of edema orthopnea PND. 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

## 2020-02-07 LAB
ANION GAP SERPL CALC-SCNC: 12 MMOL/L
BUN SERPL-MCNC: 25 MG/DL
CALCIUM SERPL-MCNC: 9.5 MG/DL
CHLORIDE SERPL-SCNC: 103 MMOL/L
CO2 SERPL-SCNC: 25 MMOL/L
CREAT SERPL-MCNC: 1.49 MG/DL
GLUCOSE SERPL-MCNC: 134 MG/DL
NT-PROBNP SERPL-MCNC: 2768 PG/ML
POTASSIUM SERPL-SCNC: 4.8 MMOL/L
SODIUM SERPL-SCNC: 141 MMOL/L

## 2020-02-27 ENCOUNTER — NON-APPOINTMENT (OUTPATIENT)
Age: 85
End: 2020-02-27

## 2020-02-27 ENCOUNTER — APPOINTMENT (OUTPATIENT)
Dept: HEART AND VASCULAR | Facility: CLINIC | Age: 85
End: 2020-02-27
Payer: MEDICARE

## 2020-02-27 VITALS
SYSTOLIC BLOOD PRESSURE: 160 MMHG | HEART RATE: 64 BPM | WEIGHT: 230 LBS | HEIGHT: 69 IN | DIASTOLIC BLOOD PRESSURE: 82 MMHG | BODY MASS INDEX: 34.07 KG/M2

## 2020-02-27 LAB — INR PPP: 2 RATIO

## 2020-02-27 PROCEDURE — 36415 COLL VENOUS BLD VENIPUNCTURE: CPT

## 2020-02-27 PROCEDURE — 99214 OFFICE O/P EST MOD 30 MIN: CPT

## 2020-02-27 PROCEDURE — 93000 ELECTROCARDIOGRAM COMPLETE: CPT

## 2020-02-27 NOTE — HISTORY OF PRESENT ILLNESS
[FreeTextEntry1] : The patient is an 85-year-old white male with a prior history of coronary bypass surgery. He underwent evaluation last year during which a catheterization revealed a patent LIMA vessel and patent vein grafts to the distal RCA with an isolated vein graft occlusion to the OM vessel EF at that time was 55%. He is also maintained on meds for chronic atrial fibrillation. He still has moderate degree of dyspnea typically with slightly greater than normal levels of activity including carrying packages and working up her pills. He has no signs of edema orthopnea PND. 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

## 2020-02-27 NOTE — ASSESSMENT
[FreeTextEntry1] : Imprerssion\par INR 2.0 \par coumadin dose 5mg x 2days \par and 2.5 x 5 mg\par will maintain current BP and Hr control drugs \par F/u Pulm not clear any inhalers will be of use

## 2020-02-27 NOTE — PHYSICAL EXAM
[General Appearance - Well Developed] : well developed [Normal Appearance] : normal appearance [Well Groomed] : well groomed [General Appearance - Well Nourished] : well nourished [No Deformities] : no deformities [Normal Conjunctiva] : the conjunctiva exhibited no abnormalities [General Appearance - In No Acute Distress] : no acute distress [Normal Oral Mucosa] : normal oral mucosa [Eyelids - No Xanthelasma] : the eyelids demonstrated no xanthelasmas [No Oral Pallor] : no oral pallor [No Oral Cyanosis] : no oral cyanosis [Normal Jugular Venous A Waves Present] : normal jugular venous A waves present [Normal Jugular Venous V Waves Present] : normal jugular venous V waves present [No Jugular Venous Sandoval A Waves] : no jugular venous sandoval A waves [Abdomen Soft] : soft [Abdomen Tenderness] : non-tender [Abdomen Mass (___ Cm)] : no abdominal mass palpated [Abnormal Walk] : normal gait [Gait - Sufficient For Exercise Testing] : the gait was sufficient for exercise testing [Nail Clubbing] : no clubbing of the fingernails [Petechial Hemorrhages (___cm)] : no petechial hemorrhages [Cyanosis, Localized] : no localized cyanosis [Skin Color & Pigmentation] : normal skin color and pigmentation [No Venous Stasis] : no venous stasis [] : no rash [Skin Lesions] : no skin lesions [No Skin Ulcers] : no skin ulcer [No Xanthoma] : no  xanthoma was observed [Scattered Wheezes] : scattered wheezing was heard [Wheezing Unilaterally On The Right At The Midlung Field] : wheezing was heard over the right midlung field [Wheezing Unilaterally On The Right At The Base] : wheezing was heard over the right base [Normal] : normal [Irregularly Irregular] : irregularly irregular [Normal S2] : normal S2 [Normal S1] : normal S1 [II] : a grade 2 [2+] : left 2+ [1+] : left 1+ [0] : left 0 [No Pitting Edema] : no pitting edema present [Rt] : varicose veins of the right leg noted [Lt] : varicose veins of the left leg noted [FreeTextEntry1] : reduced wheeze and crackles

## 2020-02-28 LAB
ANION GAP SERPL CALC-SCNC: 13 MMOL/L
BUN SERPL-MCNC: 29 MG/DL
CALCIUM SERPL-MCNC: 9 MG/DL
CHLORIDE SERPL-SCNC: 102 MMOL/L
CO2 SERPL-SCNC: 25 MMOL/L
CREAT SERPL-MCNC: 1.41 MG/DL
GLUCOSE SERPL-MCNC: 124 MG/DL
NT-PROBNP SERPL-MCNC: 1474 PG/ML
POTASSIUM SERPL-SCNC: 4.4 MMOL/L
SODIUM SERPL-SCNC: 140 MMOL/L

## 2020-04-02 RX ORDER — BLOOD SUGAR DIAGNOSTIC
STRIP MISCELLANEOUS 4 TIMES DAILY
Qty: 120 | Refills: 5 | Status: ACTIVE | COMMUNITY
Start: 2017-10-17 | End: 1900-01-01

## 2020-05-21 ENCOUNTER — NON-APPOINTMENT (OUTPATIENT)
Age: 85
End: 2020-05-21

## 2020-05-21 ENCOUNTER — APPOINTMENT (OUTPATIENT)
Dept: HEART AND VASCULAR | Facility: CLINIC | Age: 85
End: 2020-05-21
Payer: MEDICARE

## 2020-05-21 VITALS
BODY MASS INDEX: 33.33 KG/M2 | HEIGHT: 69 IN | DIASTOLIC BLOOD PRESSURE: 80 MMHG | SYSTOLIC BLOOD PRESSURE: 160 MMHG | WEIGHT: 225 LBS

## 2020-05-21 VITALS — OXYGEN SATURATION: 98 %

## 2020-05-21 DIAGNOSIS — E11.9 TYPE 2 DIABETES MELLITUS W/OUT COMPLICATIONS: ICD-10-CM

## 2020-05-21 LAB — INR PPP: 3 RATIO

## 2020-05-21 PROCEDURE — 99214 OFFICE O/P EST MOD 30 MIN: CPT

## 2020-05-21 PROCEDURE — 36415 COLL VENOUS BLD VENIPUNCTURE: CPT

## 2020-05-21 PROCEDURE — 93000 ELECTROCARDIOGRAM COMPLETE: CPT

## 2020-05-21 NOTE — PHYSICAL EXAM
[General Appearance - Well Developed] : well developed [Normal Appearance] : normal appearance [Well Groomed] : well groomed [General Appearance - Well Nourished] : well nourished [No Deformities] : no deformities [General Appearance - In No Acute Distress] : no acute distress [Normal Conjunctiva] : the conjunctiva exhibited no abnormalities [Eyelids - No Xanthelasma] : the eyelids demonstrated no xanthelasmas [Normal Oral Mucosa] : normal oral mucosa [No Oral Pallor] : no oral pallor [No Oral Cyanosis] : no oral cyanosis [Normal Jugular Venous A Waves Present] : normal jugular venous A waves present [Normal Jugular Venous V Waves Present] : normal jugular venous V waves present [No Jugular Venous Sandoval A Waves] : no jugular venous sandoval A waves [FreeTextEntry1] : reduced wheeze and crackles  [Abdomen Soft] : soft [Abdomen Tenderness] : non-tender [Abdomen Mass (___ Cm)] : no abdominal mass palpated [Abnormal Walk] : normal gait [Nail Clubbing] : no clubbing of the fingernails [Gait - Sufficient For Exercise Testing] : the gait was sufficient for exercise testing [Cyanosis, Localized] : no localized cyanosis [Petechial Hemorrhages (___cm)] : no petechial hemorrhages [Skin Color & Pigmentation] : normal skin color and pigmentation [] : no rash [No Venous Stasis] : no venous stasis [Skin Lesions] : no skin lesions [No Skin Ulcers] : no skin ulcer [No Xanthoma] : no  xanthoma was observed [Scattered Wheezes] : scattered wheezing was heard [Wheezing Unilaterally On The Right At The Midlung Field] : wheezing was heard over the right midlung field [Wheezing Unilaterally On The Right At The Base] : wheezing was heard over the right base [Normal] : normal [Irregularly Irregular] : irregularly irregular [Normal S1] : normal S1 [Normal S2] : normal S2 [II] : a grade 2 [2+] : left 2+ [1+] : left 1+ [0] : left 0 [No Pitting Edema] : no pitting edema present [Rt] : varicose veins of the right leg noted [Lt] : varicose veins of the left leg noted

## 2020-05-21 NOTE — HISTORY OF PRESENT ILLNESS
[FreeTextEntry1] : The patient is an 85-year-old white male with a prior history of coronary bypass surgery. He underwent evaluation last year during which a catheterization revealed a patent LIMA vessel and patent vein grafts to the distal RCA with an isolated vein graft occlusion to the OM vessel EF at that time was 55%. He is also maintained on meds for chronic atrial fibrillation. He still has moderate degree of dyspnea typically with slightly greater than normal levels of activity including carrying packages and working up her pills. He has no signs of edema orthopnea PND. 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

## 2020-05-22 LAB
ANION GAP SERPL CALC-SCNC: 12 MMOL/L
BUN SERPL-MCNC: 33 MG/DL
CALCIUM SERPL-MCNC: 9.9 MG/DL
CHLORIDE SERPL-SCNC: 101 MMOL/L
CO2 SERPL-SCNC: 27 MMOL/L
CREAT SERPL-MCNC: 1.41 MG/DL
GLUCOSE SERPL-MCNC: 156 MG/DL
NT-PROBNP SERPL-MCNC: 2301 PG/ML
POTASSIUM SERPL-SCNC: 5.9 MMOL/L
SODIUM SERPL-SCNC: 139 MMOL/L

## 2020-06-16 ENCOUNTER — NON-APPOINTMENT (OUTPATIENT)
Age: 85
End: 2020-06-16

## 2020-08-27 ENCOUNTER — APPOINTMENT (OUTPATIENT)
Dept: HEART AND VASCULAR | Facility: CLINIC | Age: 85
End: 2020-08-27
Payer: MEDICARE

## 2020-08-27 ENCOUNTER — NON-APPOINTMENT (OUTPATIENT)
Age: 85
End: 2020-08-27

## 2020-08-27 VITALS
WEIGHT: 218 LBS | BODY MASS INDEX: 32.29 KG/M2 | DIASTOLIC BLOOD PRESSURE: 80 MMHG | HEART RATE: 70 BPM | SYSTOLIC BLOOD PRESSURE: 148 MMHG | HEIGHT: 69 IN

## 2020-08-27 VITALS — OXYGEN SATURATION: 97 % | HEART RATE: 94 BPM

## 2020-08-27 LAB — INR PPP: 3.3 RATIO

## 2020-08-27 PROCEDURE — 85610 PROTHROMBIN TIME: CPT | Mod: QW

## 2020-08-27 PROCEDURE — 93015 CV STRESS TEST SUPVJ I&R: CPT

## 2020-08-27 PROCEDURE — 99214 OFFICE O/P EST MOD 30 MIN: CPT

## 2020-08-27 RX ORDER — WARFARIN 5 MG/1
5 TABLET ORAL
Qty: 90 | Refills: 3 | Status: DISCONTINUED | COMMUNITY
Start: 2018-01-11 | End: 2020-08-27

## 2020-08-27 NOTE — HISTORY OF PRESENT ILLNESS
[FreeTextEntry1] : The patient is an 85-year-old white male with a prior history of coronary bypass surgery. He underwent evaluation last year during which a catheterization revealed a patent LIMA vessel and patent vein grafts to the distal RCA with an isolated vein graft occlusion to the OM vessel EF at that time was 55%. He is also maintained on meds for chronic atrial fibrillation. He still has moderate degree of dyspnea typically with slightly greater than normal levels of activity including carrying packages and working up her pills. He has no signs of edema orthopnea PND. 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

## 2020-08-27 NOTE — ASSESSMENT
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

## 2020-08-27 NOTE — PHYSICAL EXAM
[General Appearance - Well Developed] : well developed [Normal Appearance] : normal appearance [Well Groomed] : well groomed [General Appearance - Well Nourished] : well nourished [No Deformities] : no deformities [Normal Conjunctiva] : the conjunctiva exhibited no abnormalities [General Appearance - In No Acute Distress] : no acute distress [Eyelids - No Xanthelasma] : the eyelids demonstrated no xanthelasmas [No Oral Pallor] : no oral pallor [Normal Oral Mucosa] : normal oral mucosa [No Oral Cyanosis] : no oral cyanosis [Normal Jugular Venous A Waves Present] : normal jugular venous A waves present [No Jugular Venous Sandoval A Waves] : no jugular venous sandoval A waves [Normal Jugular Venous V Waves Present] : normal jugular venous V waves present [Abdomen Tenderness] : non-tender [Abdomen Soft] : soft [Abdomen Mass (___ Cm)] : no abdominal mass palpated [Abnormal Walk] : normal gait [Nail Clubbing] : no clubbing of the fingernails [Gait - Sufficient For Exercise Testing] : the gait was sufficient for exercise testing [Cyanosis, Localized] : no localized cyanosis [Petechial Hemorrhages (___cm)] : no petechial hemorrhages [Skin Color & Pigmentation] : normal skin color and pigmentation [No Venous Stasis] : no venous stasis [] : no rash [No Xanthoma] : no  xanthoma was observed [No Skin Ulcers] : no skin ulcer [Skin Lesions] : no skin lesions [Wheezing Unilaterally On The Right At The Base] : wheezing was heard over the right base [Wheezing Unilaterally On The Right At The Midlung Field] : wheezing was heard over the right midlung field [Scattered Wheezes] : scattered wheezing was heard [Normal] : normal [Irregularly Irregular] : irregularly irregular [Normal S1] : normal S1 [Normal S2] : normal S2 [II] : a grade 2 [2+] : Carotid: right 2+ [1+] : left 1+ [0] : left 0 [No Pitting Edema] : no pitting edema present [Rt] : varicose veins of the right leg noted [Lt] : varicose veins of the left leg noted [FreeTextEntry1] : reduced wheeze and crackles

## 2020-11-12 ENCOUNTER — APPOINTMENT (OUTPATIENT)
Dept: HEART AND VASCULAR | Facility: CLINIC | Age: 85
End: 2020-11-12
Payer: MEDICARE

## 2020-11-12 ENCOUNTER — NON-APPOINTMENT (OUTPATIENT)
Age: 85
End: 2020-11-12

## 2020-11-12 VITALS
HEIGHT: 69 IN | DIASTOLIC BLOOD PRESSURE: 64 MMHG | WEIGHT: 215 LBS | SYSTOLIC BLOOD PRESSURE: 130 MMHG | RESPIRATION RATE: 16 BRPM | BODY MASS INDEX: 31.84 KG/M2 | HEART RATE: 78 BPM

## 2020-11-12 DIAGNOSIS — M17.0 BILATERAL PRIMARY OSTEOARTHRITIS OF KNEE: ICD-10-CM

## 2020-11-12 LAB — INR PPP: 2.9 RATIO

## 2020-11-12 PROCEDURE — 99214 OFFICE O/P EST MOD 30 MIN: CPT

## 2020-11-12 PROCEDURE — 99072 ADDL SUPL MATRL&STAF TM PHE: CPT

## 2020-11-12 PROCEDURE — 36415 COLL VENOUS BLD VENIPUNCTURE: CPT

## 2020-11-12 PROCEDURE — 93306 TTE W/DOPPLER COMPLETE: CPT

## 2020-11-12 PROCEDURE — 93000 ELECTROCARDIOGRAM COMPLETE: CPT

## 2020-11-12 PROCEDURE — 85610 PROTHROMBIN TIME: CPT | Mod: QW

## 2020-11-12 RX ORDER — ACETAMINOPHEN AND CODEINE 300; 30 MG/1; MG/1
300-30 TABLET ORAL
Qty: 60 | Refills: 0 | Status: ACTIVE | COMMUNITY
Start: 2018-05-11 | End: 1900-01-01

## 2020-11-12 NOTE — PHYSICAL EXAM
[General Appearance - Well Developed] : well developed [Normal Appearance] : normal appearance [Well Groomed] : well groomed [General Appearance - Well Nourished] : well nourished [No Deformities] : no deformities [General Appearance - In No Acute Distress] : no acute distress [Normal Conjunctiva] : the conjunctiva exhibited no abnormalities [Eyelids - No Xanthelasma] : the eyelids demonstrated no xanthelasmas [Normal Oral Mucosa] : normal oral mucosa [No Oral Pallor] : no oral pallor [No Oral Cyanosis] : no oral cyanosis [Normal Jugular Venous A Waves Present] : normal jugular venous A waves present [Normal Jugular Venous V Waves Present] : normal jugular venous V waves present [No Jugular Venous Sandoval A Waves] : no jugular venous sandoval A waves [Abdomen Soft] : soft [Abdomen Tenderness] : non-tender [Abdomen Mass (___ Cm)] : no abdominal mass palpated [Abnormal Walk] : normal gait [Gait - Sufficient For Exercise Testing] : the gait was sufficient for exercise testing [Nail Clubbing] : no clubbing of the fingernails [Cyanosis, Localized] : no localized cyanosis [Petechial Hemorrhages (___cm)] : no petechial hemorrhages [Skin Color & Pigmentation] : normal skin color and pigmentation [] : no rash [No Venous Stasis] : no venous stasis [Skin Lesions] : no skin lesions [No Skin Ulcers] : no skin ulcer [No Xanthoma] : no  xanthoma was observed [Scattered Wheezes] : scattered wheezing was heard [Wheezing Unilaterally On The Right At The Midlung Field] : wheezing was heard over the right midlung field [Wheezing Unilaterally On The Right At The Base] : wheezing was heard over the right base [Normal] : normal [Irregularly Irregular] : irregularly irregular [Normal S1] : normal S1 [Normal S2] : normal S2 [II] : a grade 2 [2+] : left 2+ [1+] : left 1+ [0] : left 0 [No Pitting Edema] : no pitting edema present [Rt] : varicose veins of the right leg noted [Lt] : varicose veins of the left leg noted [FreeTextEntry1] : reduced wheeze and crackles

## 2020-11-12 NOTE — HISTORY OF PRESENT ILLNESS
[FreeTextEntry1] : The patient is an 85-year-old white male with a prior history of coronary bypass surgery. He underwent evaluation last year during which a catheterization revealed a patent LIMA vessel and patent vein grafts to the distal RCA with an isolated vein graft occlusion to the OM vessel EF at that time was 55%. He is also maintained on meds for chronic atrial fibrillation. He still has moderate degree of dyspnea typically with slightly greater than normal levels of activity including carrying packages and working up her pills. He has no signs of edema orthopnea PND. 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

## 2020-11-13 ENCOUNTER — LABORATORY RESULT (OUTPATIENT)
Age: 85
End: 2020-11-13

## 2020-11-13 LAB
25(OH)D3 SERPL-MCNC: 42.6 NG/ML
ALBUMIN SERPL ELPH-MCNC: 3.8 G/DL
ALP BLD-CCNC: 65 U/L
ALT SERPL-CCNC: 9 U/L
ANION GAP SERPL CALC-SCNC: 15 MMOL/L
AST SERPL-CCNC: 11 U/L
BASOPHILS # BLD AUTO: 0.05 K/UL
BASOPHILS NFR BLD AUTO: 0.5 %
BILIRUB SERPL-MCNC: 0.3 MG/DL
BUN SERPL-MCNC: 41 MG/DL
CALCIUM SERPL-MCNC: 9.4 MG/DL
CHLORIDE SERPL-SCNC: 100 MMOL/L
CHOLEST SERPL-MCNC: 121 MG/DL
CO2 SERPL-SCNC: 25 MMOL/L
CREAT SERPL-MCNC: 1.71 MG/DL
EOSINOPHIL # BLD AUTO: 0.23 K/UL
EOSINOPHIL NFR BLD AUTO: 2.3 %
ESTIMATED AVERAGE GLUCOSE: 143 MG/DL
FOLATE SERPL-MCNC: 12.1 NG/ML
GLUCOSE SERPL-MCNC: 106 MG/DL
HBA1C MFR BLD HPLC: 6.6 %
HCT VFR BLD CALC: 44.4 %
HDLC SERPL-MCNC: 44 MG/DL
HGB BLD-MCNC: 12.8 G/DL
IMM GRANULOCYTES NFR BLD AUTO: 0.5 %
LDLC SERPL CALC-MCNC: 54 MG/DL
LYMPHOCYTES # BLD AUTO: 2.13 K/UL
LYMPHOCYTES NFR BLD AUTO: 21.8 %
MAN DIFF?: NORMAL
MCHC RBC-ENTMCNC: 28.8 GM/DL
MCHC RBC-ENTMCNC: 29.2 PG
MCV RBC AUTO: 101.1 FL
MONOCYTES # BLD AUTO: 0.78 K/UL
MONOCYTES NFR BLD AUTO: 8 %
NEUTROPHILS # BLD AUTO: 6.55 K/UL
NEUTROPHILS NFR BLD AUTO: 66.9 %
NONHDLC SERPL-MCNC: 77 MG/DL
NT-PROBNP SERPL-MCNC: 1712 PG/ML
PLATELET # BLD AUTO: 220 K/UL
POTASSIUM SERPL-SCNC: 4.8 MMOL/L
PROT SERPL-MCNC: 7.4 G/DL
RBC # BLD: 4.39 M/UL
RBC # FLD: 14.9 %
SODIUM SERPL-SCNC: 140 MMOL/L
T3 SERPL-MCNC: 110 NG/DL
T3RU NFR SERPL: 0.8 TBI
T4 FREE SERPL-MCNC: 1.2 NG/DL
T4 SERPL-MCNC: 7.1 UG/DL
TRIGL SERPL-MCNC: 114 MG/DL
TSH SERPL-ACNC: 1.78 UIU/ML
VIT B12 SERPL-MCNC: 225 PG/ML
WBC # FLD AUTO: 9.79 K/UL

## 2021-01-01 ENCOUNTER — APPOINTMENT (OUTPATIENT)
Dept: HEART AND VASCULAR | Facility: CLINIC | Age: 86
End: 2021-01-01
Payer: MEDICARE

## 2021-01-01 ENCOUNTER — RX RENEWAL (OUTPATIENT)
Age: 86
End: 2021-01-01

## 2021-01-01 ENCOUNTER — APPOINTMENT (OUTPATIENT)
Dept: HEART AND VASCULAR | Facility: CLINIC | Age: 86
End: 2021-01-01

## 2021-01-01 ENCOUNTER — NON-APPOINTMENT (OUTPATIENT)
Age: 86
End: 2021-01-01

## 2021-01-01 VITALS
DIASTOLIC BLOOD PRESSURE: 80 MMHG | HEIGHT: 69 IN | HEART RATE: 85 BPM | SYSTOLIC BLOOD PRESSURE: 140 MMHG | RESPIRATION RATE: 20 BRPM | BODY MASS INDEX: 29.33 KG/M2 | WEIGHT: 198 LBS

## 2021-01-01 VITALS
BODY MASS INDEX: 30.51 KG/M2 | HEART RATE: 78 BPM | RESPIRATION RATE: 16 BRPM | SYSTOLIC BLOOD PRESSURE: 140 MMHG | DIASTOLIC BLOOD PRESSURE: 70 MMHG | HEIGHT: 69 IN | WEIGHT: 206 LBS

## 2021-01-01 VITALS — HEIGHT: 69 IN | BODY MASS INDEX: 31.1 KG/M2 | WEIGHT: 210 LBS

## 2021-01-01 VITALS — OXYGEN SATURATION: 99 %

## 2021-01-01 DIAGNOSIS — I48.91 UNSPECIFIED ATRIAL FIBRILLATION: ICD-10-CM

## 2021-01-01 DIAGNOSIS — R06.00 DYSPNEA, UNSPECIFIED: ICD-10-CM

## 2021-01-01 DIAGNOSIS — I73.9 PERIPHERAL VASCULAR DISEASE, UNSPECIFIED: ICD-10-CM

## 2021-01-01 DIAGNOSIS — I25.9 CHRONIC ISCHEMIC HEART DISEASE, UNSPECIFIED: ICD-10-CM

## 2021-01-01 DIAGNOSIS — J84.10 PULMONARY FIBROSIS, UNSPECIFIED: ICD-10-CM

## 2021-01-01 DIAGNOSIS — I10 ESSENTIAL (PRIMARY) HYPERTENSION: ICD-10-CM

## 2021-01-01 LAB
INR PPP: 1.3 RATIO
NT-PROBNP SERPL-MCNC: 3178 PG/ML

## 2021-01-01 PROCEDURE — 93308 TTE F-UP OR LMTD: CPT

## 2021-01-01 PROCEDURE — 36415 COLL VENOUS BLD VENIPUNCTURE: CPT

## 2021-01-01 PROCEDURE — 93321 DOPPLER ECHO F-UP/LMTD STD: CPT

## 2021-01-01 PROCEDURE — ZZZZZ: CPT

## 2021-01-01 PROCEDURE — 93325 DOPPLER ECHO COLOR FLOW MAPG: CPT

## 2021-01-01 PROCEDURE — 93000 ELECTROCARDIOGRAM COMPLETE: CPT

## 2021-01-01 PROCEDURE — 99214 OFFICE O/P EST MOD 30 MIN: CPT

## 2021-01-01 PROCEDURE — 99215 OFFICE O/P EST HI 40 MIN: CPT

## 2021-01-01 RX ORDER — BUDESONIDE AND FORMOTEROL FUMARATE DIHYDRATE 160; 4.5 UG/1; UG/1
160-4.5 AEROSOL RESPIRATORY (INHALATION)
Qty: 1 | Refills: 5 | Status: ACTIVE | COMMUNITY
Start: 2021-01-01 | End: 1900-01-01

## 2021-01-01 RX ORDER — OMEPRAZOLE 40 MG/1
40 CAPSULE, DELAYED RELEASE ORAL
Qty: 90 | Refills: 0 | Status: ACTIVE | COMMUNITY
Start: 2017-10-17

## 2021-01-01 RX ORDER — METFORMIN HYDROCHLORIDE 500 MG/1
500 TABLET, COATED ORAL
Refills: 0 | Status: ACTIVE | COMMUNITY
Start: 2017-10-17

## 2021-01-01 RX ORDER — SIMVASTATIN 40 MG/1
40 TABLET, FILM COATED ORAL
Refills: 0 | Status: ACTIVE | COMMUNITY
Start: 2017-10-17

## 2021-01-01 RX ORDER — TORSEMIDE 20 MG/1
20 TABLET ORAL
Qty: 180 | Refills: 2 | Status: ACTIVE | COMMUNITY
Start: 2020-02-06 | End: 1900-01-01

## 2021-02-18 ENCOUNTER — APPOINTMENT (OUTPATIENT)
Dept: HEART AND VASCULAR | Facility: CLINIC | Age: 86
End: 2021-02-18
Payer: MEDICARE

## 2021-02-18 ENCOUNTER — NON-APPOINTMENT (OUTPATIENT)
Age: 86
End: 2021-02-18

## 2021-02-18 VITALS
WEIGHT: 224 LBS | DIASTOLIC BLOOD PRESSURE: 70 MMHG | BODY MASS INDEX: 33.18 KG/M2 | HEART RATE: 65 BPM | SYSTOLIC BLOOD PRESSURE: 146 MMHG | HEIGHT: 69 IN

## 2021-02-18 LAB — INR PPP: 1.6 RATIO

## 2021-02-18 PROCEDURE — 93000 ELECTROCARDIOGRAM COMPLETE: CPT

## 2021-02-18 PROCEDURE — 99072 ADDL SUPL MATRL&STAF TM PHE: CPT

## 2021-02-18 PROCEDURE — 85610 PROTHROMBIN TIME: CPT | Mod: QW

## 2021-02-18 PROCEDURE — 99214 OFFICE O/P EST MOD 30 MIN: CPT

## 2021-02-18 NOTE — ASSESSMENT
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

## 2021-02-18 NOTE — HISTORY OF PRESENT ILLNESS
[FreeTextEntry1] : The patient is an 85-year-old white male with a prior history of coronary bypass surgery. He underwent evaluation last year during which a catheterization revealed a patent LIMA vessel and patent vein grafts to the distal RCA with an isolated vein graft occlusion to the OM vessel EF at that time was 55%. He is also maintained on meds for chronic atrial fibrillation. He still has moderate degree of dyspnea typically with slightly greater than normal levels of activity including carrying packages and working up her pills. He has no signs of edema orthopnea PND. 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 \par 2/27/2020\par was on extra torsemide with no real clinical change \par CXR fibrotic changes no effusion or chf \par No edema less orthopnea \par 5/21/2020\par dyspnea at baseline \par no edema \par recent toe infection right foot seen by vasc \par resolving w abx \par Minimal activity due to covid isolation \par 8/27/2020\par progressive dyspnea prior Lobectomy \par No edema no orthopnea \par 11/12/2020\par Chronic dyaspnea with trivial activity \par no edema noted no cough or orthopnea \par known lung disease in past \par 2/18/21\par remains stable baseline dyspnea relieved witrh rets no sscp \par no edmema

## 2021-02-18 NOTE — PHYSICAL EXAM
[General Appearance - Well Developed] : well developed [Normal Appearance] : normal appearance [Well Groomed] : well groomed [General Appearance - Well Nourished] : well nourished [No Deformities] : no deformities [General Appearance - In No Acute Distress] : no acute distress [Normal Conjunctiva] : the conjunctiva exhibited no abnormalities [Eyelids - No Xanthelasma] : the eyelids demonstrated no xanthelasmas [Normal Oral Mucosa] : normal oral mucosa [No Oral Pallor] : no oral pallor [No Oral Cyanosis] : no oral cyanosis [Normal Jugular Venous A Waves Present] : normal jugular venous A waves present [Normal Jugular Venous V Waves Present] : normal jugular venous V waves present [No Jugular Venous Sandoval A Waves] : no jugular venous sandoval A waves [FreeTextEntry1] : reduced wheeze and crackles  [Abdomen Soft] : soft [Abdomen Tenderness] : non-tender [Abdomen Mass (___ Cm)] : no abdominal mass palpated [Abnormal Walk] : normal gait [Gait - Sufficient For Exercise Testing] : the gait was sufficient for exercise testing [Nail Clubbing] : no clubbing of the fingernails [Cyanosis, Localized] : no localized cyanosis [Petechial Hemorrhages (___cm)] : no petechial hemorrhages [Skin Color & Pigmentation] : normal skin color and pigmentation [] : no rash [No Venous Stasis] : no venous stasis [Skin Lesions] : no skin lesions [No Skin Ulcers] : no skin ulcer [No Xanthoma] : no  xanthoma was observed [Scattered Wheezes] : scattered wheezing was heard [Wheezing Unilaterally On The Right At The Midlung Field] : wheezing was heard over the right midlung field [Wheezing Unilaterally On The Right At The Base] : wheezing was heard over the right base [Normal] : normal [Irregularly Irregular] : irregularly irregular [Normal S1] : normal S1 [Normal S2] : normal S2 [II] : a grade 2 [2+] : left 2+ [1+] : left 1+ [0] : left 0 [No Pitting Edema] : no pitting edema present [Rt] : varicose veins of the right leg noted [Lt] : varicose veins of the left leg noted

## 2021-03-11 ENCOUNTER — RX RENEWAL (OUTPATIENT)
Age: 86
End: 2021-03-11

## 2021-05-03 ENCOUNTER — APPOINTMENT (OUTPATIENT)
Dept: HEART AND VASCULAR | Facility: CLINIC | Age: 86
End: 2021-05-03
Payer: MEDICARE

## 2021-05-03 ENCOUNTER — NON-APPOINTMENT (OUTPATIENT)
Age: 86
End: 2021-05-03

## 2021-05-03 VITALS
SYSTOLIC BLOOD PRESSURE: 110 MMHG | DIASTOLIC BLOOD PRESSURE: 60 MMHG | WEIGHT: 216 LBS | BODY MASS INDEX: 31.99 KG/M2 | HEIGHT: 69 IN | HEART RATE: 76 BPM

## 2021-05-03 PROCEDURE — 93000 ELECTROCARDIOGRAM COMPLETE: CPT

## 2021-05-03 PROCEDURE — 99214 OFFICE O/P EST MOD 30 MIN: CPT

## 2021-05-03 PROCEDURE — 36415 COLL VENOUS BLD VENIPUNCTURE: CPT

## 2021-05-03 PROCEDURE — 99072 ADDL SUPL MATRL&STAF TM PHE: CPT

## 2021-05-04 LAB
ALBUMIN SERPL ELPH-MCNC: 3.7 G/DL
ALP BLD-CCNC: 92 U/L
ALT SERPL-CCNC: 9 U/L
ANION GAP SERPL CALC-SCNC: 11 MMOL/L
APTT BLD: 42.1 SEC
AST SERPL-CCNC: 12 U/L
BASOPHILS # BLD AUTO: 0.02 K/UL
BASOPHILS NFR BLD AUTO: 0.2 %
BILIRUB SERPL-MCNC: 0.4 MG/DL
BUN SERPL-MCNC: 28 MG/DL
CALCIUM SERPL-MCNC: 9.4 MG/DL
CHLORIDE SERPL-SCNC: 102 MMOL/L
CO2 SERPL-SCNC: 25 MMOL/L
CREAT SERPL-MCNC: 1.37 MG/DL
EOSINOPHIL # BLD AUTO: 0.11 K/UL
EOSINOPHIL NFR BLD AUTO: 1.2 %
GLUCOSE SERPL-MCNC: 105 MG/DL
HCT VFR BLD CALC: 39.5 %
HGB BLD-MCNC: 11.3 G/DL
IMM GRANULOCYTES NFR BLD AUTO: 0.3 %
INR PPP: 3.14 RATIO
LYMPHOCYTES # BLD AUTO: 1.64 K/UL
LYMPHOCYTES NFR BLD AUTO: 17.7 %
MAN DIFF?: NORMAL
MCHC RBC-ENTMCNC: 27.5 PG
MCHC RBC-ENTMCNC: 28.6 GM/DL
MCV RBC AUTO: 96.1 FL
MONOCYTES # BLD AUTO: 0.62 K/UL
MONOCYTES NFR BLD AUTO: 6.7 %
NEUTROPHILS # BLD AUTO: 6.84 K/UL
NEUTROPHILS NFR BLD AUTO: 73.9 %
PLATELET # BLD AUTO: 263 K/UL
POTASSIUM SERPL-SCNC: 5.2 MMOL/L
PROT SERPL-MCNC: 7.6 G/DL
PT BLD: 35.4 SEC
RBC # BLD: 4.11 M/UL
RBC # FLD: 15.9 %
SODIUM SERPL-SCNC: 137 MMOL/L
WBC # FLD AUTO: 9.26 K/UL

## 2021-05-05 NOTE — PHYSICAL EXAM
[General Appearance - Well Developed] : well developed [Normal Appearance] : normal appearance [Well Groomed] : well groomed [General Appearance - Well Nourished] : well nourished [No Deformities] : no deformities [General Appearance - In No Acute Distress] : no acute distress [Normal Conjunctiva] : the conjunctiva exhibited no abnormalities [Eyelids - No Xanthelasma] : the eyelids demonstrated no xanthelasmas [Normal Oral Mucosa] : normal oral mucosa [No Oral Pallor] : no oral pallor [No Oral Cyanosis] : no oral cyanosis [Normal Jugular Venous A Waves Present] : normal jugular venous A waves present [Normal Jugular Venous V Waves Present] : normal jugular venous V waves present [No Jugular Venous Sandoval A Waves] : no jugular venous sandoval A waves [Normal Rate] : normal [Irregularly Irregular] : irregularly irregular [Normal S1] : normal S1 [Normal S2] : normal S2 [II] : a grade 2 [Right Carotid Bruit] : right carotid bruit heard [Left Carotid Bruit] : left carotid bruit heard [No Pitting Edema] : no pitting edema present [Abdomen Soft] : soft [Abdomen Tenderness] : non-tender [Abdomen Mass (___ Cm)] : no abdominal mass palpated [Abnormal Walk] : normal gait [Gait - Sufficient For Exercise Testing] : the gait was sufficient for exercise testing [Nail Clubbing] : no clubbing of the fingernails [Cyanosis, Localized] : no localized cyanosis [Petechial Hemorrhages (___cm)] : no petechial hemorrhages [] : no ischemic changes [FreeTextEntry1] : absent BS right base

## 2021-05-05 NOTE — HISTORY OF PRESENT ILLNESS
[Preoperative Visit] : for a medical evaluation prior to surgery [Scheduled Procedure ___] : a [unfilled] [Surgeon Name ___] : surgeon: [unfilled] [Dyspnea] : dyspnea [Fever] : no fever [Chills] : no chills [Fatigue] : no fatigue [Chest Pain] : no chest pain [Dysuria] : no dysuria [Urinary Frequency] : no urinary frequency [Diarrhea] : no diarrhea [de-identified] : Shelli [FreeTextEntry1] : Patient is a 88-year-old male well-known to me with prior bypass surgery chronic atrial fibrillation patient is also status post right thoracotomy for lung cancer in the remote past.  Patient has baseline now 1 block dyspnea largely on the basis of pulmonary disease he has had no new ischemic disease in a number of years.  Patient was admitted multiple times for urinary obstruction and is now preop for a TURP procedure.  He currently is wearing a Wilder permanently for bladder drainage.  His dyspnea his pattern is pretty stable with 1 block dyspnea with no edema no orthopnea no PND.  He is maintained on anticoagulation in light of the fact that he has atrial fibrillation on a chronic basis

## 2021-05-05 NOTE — REVIEW OF SYSTEMS
[Feeling Fatigued] : feeling fatigued [SOB] : shortness of breath [Dyspnea on exertion] : dyspnea during exertion [Cough] : cough [Negative] : Gastrointestinal [Palpitations] : no palpitations [Coughing Up Blood] : no hemoptysis [Snoring] : no snoring

## 2021-05-05 NOTE — ASSESSMENT
[FreeTextEntry1] : Optomized for planned TURP \par will stop Coumadin 3 days preop and resume post op as per surgery \par Bloods pending \par

## 2021-06-16 ENCOUNTER — APPOINTMENT (OUTPATIENT)
Dept: HEART AND VASCULAR | Facility: CLINIC | Age: 86
End: 2021-06-16

## 2021-08-06 PROBLEM — I73.9 PERIPHERAL VASCULAR DISEASE: Status: ACTIVE | Noted: 2018-08-22

## 2021-08-06 PROBLEM — I25.9 CHRONIC ISCHEMIC HEART DISEASE: Status: ACTIVE | Noted: 2018-08-22

## 2021-08-06 NOTE — REVIEW OF SYSTEMS
[Fever] : no fever [Chills] : no chills [SOB] : shortness of breath [Dyspnea on exertion] : dyspnea during exertion [Cough] : cough [Abdominal Pain] : no abdominal pain [Change in Appetite] : no change in appetite [Constipation] : no constipation

## 2021-08-06 NOTE — ASSESSMENT
[FreeTextEntry1] : Cardiac status stable for planned cysto \par Will need pulmonary optimization prior to surgery \par will stop coumadin 4 days preop and resume when surgically feasible

## 2021-08-06 NOTE — HISTORY OF PRESENT ILLNESS
[Preoperative Visit] : for a medical evaluation prior to surgery [Scheduled Procedure ___] : a [unfilled] [Surgeon Name ___] : surgeon: [unfilled] [Good] : Good [Fever] : no fever [Chills] : no chills [Fatigue] : no fatigue [Chest Pain] : no chest pain [Cough] : cough [de-identified] : Silver [FreeTextEntry1] : Patient is an 88-year-old white male with prior history of ischemic heart disease status post bypass surgery in the remote past he is also status post an aortic aneurysm repair in the past.  Patient also has had prior right lung thoracotomy and baseline COPD and lung fibrosis.  Patient has been followed for prostate cancer and was noted recently to have bone metastases on a PET scan he was also noted to have evidence of a right hydronephrosis suggestive of ureteral stenosis and is currently preop for planned cystoscopy and right retrograde stent placement.  Patient has been on Coumadin therapy for A. fib which will need to be stopped prior to his procedure.  His baseline functional status is less than 4 METS largely due to underlying lung disease prior assessments of ischemia were negative.

## 2021-08-06 NOTE — END OF VISIT
[Time Spent: ___ minutes] : I have spent [unfilled] minutes of time on the encounter. [>50% of the face to face encounter time was spent on counseling and/or coordination of care for ___] : Greater than 50% of the face to face encounter time was spent on counseling and/or coordination of care for [unfilled] Hypothyroidism, unspecified type

## 2021-08-06 NOTE — PHYSICAL EXAM
[Normal Conjunctiva] : the conjunctiva exhibited no abnormalities [Normal Oral Mucosa] : normal oral mucosa [FreeTextEntry1] : scattered wheeze bilat  [5th Left ICS - MCL] : palpated at the 5th LICS in the midclavicular line [Normal] : normal [Irregularly Irregular] : irregularly irregular [Normal S1] : normal S1 [Normal S2] : normal S2 [II] : a grade 2 [Right Carotid Bruit] : no bruit heard over the right carotid [___ +] : bilateral [unfilled]U+ pretibial pitting edema [Rt] : varicose veins of the right leg noted [Lt] : varicose veins of the left leg noted [Abdomen Soft] : soft [Abdomen Tenderness] : non-tender [Abdomen Mass (___ Cm)] : no abdominal mass palpated [Abnormal Walk] : normal gait [Gait - Sufficient For Exercise Testing] : the gait was sufficient for exercise testing [Nail Clubbing] : no clubbing of the fingernails [Cyanosis, Localized] : no localized cyanosis [Petechial Hemorrhages (___cm)] : no petechial hemorrhages [] : no ischemic changes

## 2021-11-05 PROBLEM — I48.91 ATRIAL FIBRILLATION, UNSPECIFIED TYPE: Status: ACTIVE | Noted: 2018-08-22

## 2021-11-05 PROBLEM — I10 BENIGN ESSENTIAL HTN: Status: ACTIVE | Noted: 2020-02-06

## 2021-11-05 PROBLEM — R06.00 DYSPNEA ON EXERTION: Status: ACTIVE | Noted: 2018-08-22

## 2021-11-05 PROBLEM — J84.10 PULMONARY FIBROSIS: Status: ACTIVE | Noted: 2020-02-27

## 2021-11-05 NOTE — HISTORY OF PRESENT ILLNESS
[FreeTextEntry1] : Seen by [facility] for prostate can meds held \par has severe weakness and sob with minimal effort \par prior h/o copd \par on coumadin for afib\par

## 2021-11-05 NOTE — REVIEW OF SYSTEMS
[Feeling Fatigued] : feeling fatigued [Weight Loss (___ Lbs)] : [unfilled] ~Ulb weight loss [Dyspnea on exertion] : dyspnea during exertion [SOB] : shortness of breath [Cough] : cough [Wheezing] : wheezing [Negative] : Genitourinary

## 2021-11-05 NOTE — ASSESSMENT
[FreeTextEntry1] : INR1.3\par current couamdin 2.5 mg qd \par will increase to 5mg x3 and 2.5 4x \par Baseline dyspnea with sat of 99 on RA\par with walking HR goes to 123 sat 99 \par Echo Lvef 55\par marked dilated LA 6 cm\par Severe phtn PAS >100 mmhg\par

## 2021-11-05 NOTE — PHYSICAL EXAM
[Frail] : frail [Ill-Appearing] : ill-appearing [Normal] : normal gait [No Edema] : no edema [de-identified] : crackles righ base  [de-identified] : dist hs irr irr

## 2022-01-01 ENCOUNTER — RX RENEWAL (OUTPATIENT)
Age: 87
End: 2022-01-01

## 2022-01-01 RX ORDER — BENAZEPRIL HYDROCHLORIDE 20 MG/1
20 TABLET, FILM COATED ORAL
Qty: 180 | Refills: 2 | Status: ACTIVE | COMMUNITY
Start: 2021-03-11 | End: 1900-01-01

## 2022-01-01 RX ORDER — METOPROLOL TARTRATE 25 MG/1
25 TABLET, FILM COATED ORAL
Qty: 90 | Refills: 3 | Status: ACTIVE | COMMUNITY
Start: 2018-06-12 | End: 1900-01-01

## 2022-01-01 RX ORDER — WARFARIN 2.5 MG/1
2.5 TABLET ORAL
Qty: 120 | Refills: 0 | Status: ACTIVE | COMMUNITY
Start: 2021-01-01 | End: 1900-01-01

## 2024-04-03 NOTE — H&P ADULT - TEMPERATURE IN CELSIUS (DEGREES C)
36.3 Additional Notes: Patient will be out of town in 2 weeks. Will try to have  remove sutures or go to a little clinic or urgent care if needed. Detail Level: Zone Render Risk Assessment In Note?: no